# Patient Record
Sex: FEMALE | Race: WHITE | Employment: FULL TIME | URBAN - METROPOLITAN AREA
[De-identification: names, ages, dates, MRNs, and addresses within clinical notes are randomized per-mention and may not be internally consistent; named-entity substitution may affect disease eponyms.]

---

## 2017-03-10 ENCOUNTER — OFFICE VISIT (OUTPATIENT)
Dept: NEUROLOGY | Age: 42
End: 2017-03-10

## 2017-03-10 VITALS
HEART RATE: 85 BPM | WEIGHT: 228 LBS | SYSTOLIC BLOOD PRESSURE: 140 MMHG | OXYGEN SATURATION: 97 % | HEIGHT: 67 IN | RESPIRATION RATE: 14 BRPM | BODY MASS INDEX: 35.79 KG/M2 | DIASTOLIC BLOOD PRESSURE: 80 MMHG

## 2017-03-10 DIAGNOSIS — G57.13 MERALGIA PARESTHETICA, BILATERAL LOWER LIMBS: ICD-10-CM

## 2017-03-10 DIAGNOSIS — E53.8 B12 DEFICIENCY: ICD-10-CM

## 2017-03-10 DIAGNOSIS — R20.0 NUMBNESS IN BOTH LEGS: Primary | ICD-10-CM

## 2017-03-10 NOTE — PROGRESS NOTES
Chief Complaint   Patient presents with   Florentino Rosas is a 37 y.o. female who came in for a neurological consultation requested by Dr. Diana Howard. She was found to have B12 deficiency and recently started taking B12 injections. Back in 2016 she had bladder issues and some associated numbness and tingling distally in both legs that resolved after the bladder problems went away. For the past week or so she has been experiencing numbness and tingling in the anterior and lateral aspect of both thighs. She has been noticing that it comes on if she sits or stands up and she is better when she is waking up in the morning. Denies any weakness in the legs or changes in bladder or bowel function. She has also been experiencing numbness in the groins and occasionally back pain. She had an MRI scan of the lumbar spine recently which was negative. 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 She is typically reclined back in her recliner and sits in that position for several hours at a time. No symptoms in the upper extremities. No changes in vision, speech or swallowing ability. She has been losing weight intentionally. Past Medical History:   Diagnosis Date    Dysfunctional uterine bleeding     Encounter for IUD insertion 04/29/16    Kirsten    Renal stones      Current Outpatient Prescriptions   Medication Sig    FLUTICASONE/SALMETEROL (ADVAIR DISKUS IN) Take  by inhalation.  FLUTICASONE PROPIONATE (FLOVENT DISKUS IN) Take  by inhalation.  CHOLECALCIFEROL, VITAMIN D3, (VITAMIN D3 PO) Take  by mouth.  loratadine (CLARITIN) 10 mg tablet Take 10 mg by mouth.  fluticasone (FLONASE) 50 mcg/actuation nasal spray 2 Sprays by Both Nostrils route once. No current facility-administered medications for this visit.       Allergies   Allergen Reactions    Biaxin [Clarithromycin] Unknown (comments)    Ceclor [Cefaclor] Unknown (comments)    Shellfish Derived Unknown (comments)     As well as fish.  Sulfa (Sulfonamide Antibiotics) Unknown (comments)    Tetracyclines Unknown (comments)     Family History   Problem Relation Age of Onset    Diabetes Father      Type II    Diabetes Other      Aunt & Uncle    Endometriosis Mother      Social History   Substance Use Topics    Smoking status: Current Every Day Smoker    Smokeless tobacco: Never Used    Alcohol use 1.8 oz/week     0 Standard drinks or equivalent, 3 Glasses of wine per week     Past Surgical History:   Procedure Laterality Date    HX APPENDECTOMY      HX GYN  1994    Cryo    HX LITHOTRIPSY           REVIEW OF SYSTEMS  Review of Systems - History obtained from the patient  Psychological ROS: negative  ENT ROS: negative  Hematological and Lymphatic ROS: negative  Endocrine ROS: negative  Respiratory ROS: no cough, shortness of breath, or wheezing  Cardiovascular ROS: no chest pain or dyspnea on exertion  Gastrointestinal ROS: no abdominal pain, change in bowel habits, or black or bloody stools  Genito-Urinary ROS: no dysuria, trouble voiding, or hematuria  Musculoskeletal ROS: negative  Dermatological ROS: negative      PHYSICAL EXAMINATION:    Visit Vitals    /80    Pulse 85    Resp 14    Ht 5' 7\" (1.702 m)    Wt 103.4 kg (228 lb)    SpO2 97%    BMI 35.71 kg/m2     General:  Well defined, nourished, and groomed individual in no acute distress. Neck: Supple, nontender, thyroid within normal limits, no JVD, no bruits, no pain with resistance to active range of motion. Heart: Regular rate and rhythm, no murmurs, rub, or gallop. Normal S1S2. Lungs:  Clear to auscultation bilaterally with equal chest expansion, no cough, no wheeze  Musculoskeletal:  Extremities revealed no edema and had full range of motion of joints.     Psych:  Good mood and bright affect    NEUROLOGICAL EXAMINATION:     Mental Status:   Alert and oriented to person, place, and time with recent and remote memory intact. Attention span and concentration are normal. Speech is fluent with a full fund of knowledge. Cranial Nerves:    II, III, IV, VI:  Visual acuity grossly intact. Visual fields are normal.    Pupils are equal, round, and reactive to light and accommodation. Extra-ocular movements are full and fluid. Fundoscopic exam was benign, no ptosis or nystagmus. V-XII: Hearing is grossly intact. Facial features are symmetric, with normal sensation and strength. The palate rises symmetrically and the tongue protrudes midline. Sternocleidomastoids 5/5. Motor Examination: Normal tone, bulk, and strength. 5/5 muscle strength throughout. No cogwheel rigidity or clonus present. Sensory exam:  Normal throughout to pinprick, temperature, and vibration sense. Normal proprioception. Coordination:  Heel-to-shin was smooth and symmetrical bilaterally. Finger to nose and rapid arm movement testing was normal.   No resting or intention tremor    Gait and Station:  Steady while walking on toes, heels, and with tandem walking. Normal arm swing. No Rhomberg or pronator drift. No muscle wasting or fasiculations noted. Reflexes:  DTRs 2+ throughout. Toes downgoing. LABS / IMAGING  MRI Results (most recent):    Results from Hospital Encounter encounter on 10/11/16   MRI LUMB SPINE W WO CONT   Narrative INDICATION:  PARATHESIA OF LOWER EXTREMITY AND LOW BACK PAIN     EXAMINATION:  MRI LUMBAR SPINE    COMPARISON: None    TECHNIQUE: MR imaging of the lumbar spine was performed with sagittal T1, T2,  STIR;  axial T1, T2. Pre and post contrast imaging was performed using 10 mL of  Gadavist.    FINDINGS:    There is normal alignment of the lumbar spine. Vertebral body heights are  maintained. Marrow signal is normal.  The conus medullaris terminates at L1-2. No abnormal intraspinal enhancement. L1/2:  The spinal canal and foramina are widely patent.     L2/3:  The spinal canal and foramina are widely patent. L3/4:  The spinal canal and foramina are widely patent. L4/5:  The spinal canal and foramina are widely patent. L5/S1:  The spinal canal and foramina are widely patent. Impression IMPRESSION:  No significant abnormality. No abnormal enhancement. ASSESSMENT    ICD-10-CM ICD-9-CM    1. Numbness in both legs R20.0 782.0    2. B12 deficiency E53.8 266.2    3. Meralgia paresthetica, bilateral lower limbs G57.13 355.1        DISCUSSION  Ms. Malachi Rodriguez has been having numbness in the anterior aspects of both eyes for the past week. It is bilaterally symmetric and associated with mild back pain. MRI of the lumbar spine is negative which is reassuring. Given the fact that this has a positional component, it may just be meralgia paresthetica given her body habitus. She should avoid sitting or standing for prolonged periods, continue with efforts of weight loss, strengthen abdominal/core muscles and avoid tight fitting clothes. Vitamin B12 injections may also help. If the symptoms do not improve or worsen, will consider additional workup including MRI of the thoracic spine and to rule out other central/demyelinating causes. Follow-up in 3 months. Thank you for allowing me to participate in the care of Ms. Perez. Please feel free to contact me if you have any questions. I will be happy to follow to follow her along with you.       John Simpson MD  Diplomate, American Board of Psychiatry & Neurology (Neurology)  Al[place] Mehta Board of Psychiatry & Neurology (Clinical Neurophysiology)  Diplomate, American Board of Electrodiagnostic Medicine

## 2017-03-10 NOTE — MR AVS SNAPSHOT
Visit Information Date & Time Provider Department Dept. Phone Encounter #  
 3/10/2017  3:00 PM John Simpson MD [facility] at [place] [id number] Follow-up Instructions Return in about 3 months (around 6/10/2017). Upcoming Health Maintenance Date Due Pneumococcal 19-64 Medium Risk (1 of 1 - PPSV23) 8/16/1995 DTaP/Tdap/Td series (1 - Tdap) 8/16/1997 INFLUENZA AGE 9 TO ADULT 8/1/2016 PAP AKA CERVICAL CYTOLOGY 4/8/2021 Allergies as of 3/10/2017  Review Complete On: 3/10/2017 By: John Simpson MD  
  
 Severity Noted Reaction Type Reactions Biaxin [Clarithromycin]  04/07/2016    Unknown (comments) Ceclor [Cefaclor]  04/07/2016    Unknown (comments) Shellfish Derived  04/07/2016    Unknown (comments) As well as fish. Sulfa (Sulfonamide Antibiotics)  04/07/2016    Unknown (comments) Tetracyclines  04/07/2016    Unknown (comments) Current Immunizations  Never Reviewed No immunizations on file. Not reviewed this visit You Were Diagnosed With   
  
 Codes Comments Numbness in both legs    -  Primary ICD-10-CM: R20.0 ICD-9-CM: 782.0 B12 deficiency     ICD-10-CM: E53.8 ICD-9-CM: 266.2 Meralgia paresthetica, bilateral lower limbs     ICD-10-CM: G57.13 ICD-9-CM: 355.1 Vitals BP Pulse Resp Height(growth percentile) Weight(growth percentile) SpO2  
 140/80 85 14 5' 7\" (1.702 m) 228 lb (103.4 kg) 97% BMI OB Status Smoking Status 35.71 kg/m2 Having regular periods Current Every Day Smoker Vitals History BMI and BSA Data Body Mass Index Body Surface Area 35.71 kg/m 2 2.21 m 2 Preferred Pharmacy Pharmacy Name Phone [id number] [phone] , [address] [phone] Your Updated Medication List  
  
   
This list is accurate as of: 3/10/17  3:18 PM.  Always use your most recent med list.  
  
  
  
  
 ADVAIR DISKUS IN Take  by inhalation. * fluticasone 50 mcg/actuation nasal spray Commonly known as:  Avelina King 2 Sprays by Both Nostrils route once. * FLOVENT DISKUS IN Take  by inhalation. loratadine 10 mg tablet Commonly known as:  Marc [place] Take 10 mg by mouth. VITAMIN D3 PO Take  by mouth. * Notice: This list has 2 medication(s) that are the same as other medications prescribed for you. Read the directions carefully, and ask your doctor or other care provider to review them with you. Follow-up Instructions Return in about 3 months (around 6/10/2017). Introducing [facility]! Matty Nielsen introduces [employer] patient portal. Now you can access parts of your medical record, email your doctor's office, and request medication refills online. 1. In your internet browser, go to [email or web address] 2. Click on the First Time User? Click Here link in the Sign In box. You will see the New Member Sign Up page. 3. Enter your [employer] Access Code exactly as it appears below. You will not need to use this code after youve completed the sign-up process. If you do not sign up before the expiration date, you must request a new code. · [employer] Access Code: [email or web address] Expires: 6/8/2017  2:36 PM 
 
4. Enter the last four digits of your Social Security Number (xxxx) and Date of Birth (mm/dd/yyyy) as indicated and click Submit. You will be taken to the next sign-up page. 5. Create a [employer] ID. This will be your [employer] login ID and cannot be changed, so think of one that is secure and easy to remember. 6. Create a [employer] password. You can change your password at any time. 7. Enter your Password Reset Question and Answer. This can be used at a later time if you forget your password. 8. Enter your e-mail address. You will receive e-mail notification when new information is available in [address] 9. Click Sign Up. You can now view and download portions of your medical record. 10. Click the Download Summary menu link to download a portable copy of your medical information. If you have questions, please visit the Frequently Asked Questions section of the [employer] website. Remember, [employer] is NOT to be used for urgent needs. For medical emergencies, dial 911. Now available from your iPhone and Android! Please provide this summary of care documentation to your next provider. Your primary care clinician is listed as Nithya. If you have any questions after today's visit, please call [phone].

## 2017-03-10 NOTE — PROGRESS NOTES
Patient is here for numbness from knees to waist  Worse when standing  October was having numbness from knees down, was also having bladder issues

## 2017-03-10 NOTE — LETTER
3/10/2017 3:26 PM 
 
Patient:  Lanny Martinez YOB: 1975 Date of Visit: 3/10/2017 Dear MD Nathan Leo [phone] VIA Facsimile: [phone] 
 : Thank you for referring Ms. Gwen Ruano to me for evaluation/treatment. Below are the relevant portions of my assessment and plan of care. If you have questions, please do not hesitate to call me. I look forward to following Ms. Perez along with you. Sincerely, Gerald Fox MD

## 2019-07-18 ENCOUNTER — OFFICE VISIT (OUTPATIENT)
Dept: OBGYN CLINIC | Age: 44
End: 2019-07-18

## 2019-07-18 ENCOUNTER — HOSPITAL ENCOUNTER (OUTPATIENT)
Dept: MAMMOGRAPHY | Age: 44
Discharge: HOME | End: 2019-07-18
Attending: OBSTETRICS & GYNECOLOGY
Payer: COMMERCIAL

## 2019-07-18 VITALS
DIASTOLIC BLOOD PRESSURE: 60 MMHG | HEIGHT: 67 IN | BODY MASS INDEX: 37.51 KG/M2 | SYSTOLIC BLOOD PRESSURE: 122 MMHG | WEIGHT: 239 LBS

## 2019-07-18 DIAGNOSIS — R92.8 ABNORMAL MAMMOGRAM OF RIGHT BREAST: ICD-10-CM

## 2019-07-18 DIAGNOSIS — Z01.419 ENCOUNTER FOR GYNECOLOGICAL EXAMINATION (GENERAL) (ROUTINE) WITHOUT ABNORMAL FINDINGS: Primary | ICD-10-CM

## 2019-07-18 DIAGNOSIS — E66.01 SEVERE OBESITY (HCC): ICD-10-CM

## 2019-07-18 PROCEDURE — 76642 ULTRASOUND BREAST LIMITED: CPT

## 2019-07-18 RX ORDER — METFORMIN HYDROCHLORIDE 500 MG/1
1000 TABLET, EXTENDED RELEASE ORAL
COMMUNITY
Start: 2019-07-11 | End: 2019-10-10

## 2019-07-18 RX ORDER — FLUCONAZOLE 200 MG/1
200 TABLET ORAL
Qty: 3 TAB | Refills: 1 | Status: SHIPPED | OUTPATIENT
Start: 2019-07-18 | End: 2019-07-23

## 2019-07-18 NOTE — PATIENT INSTRUCTIONS

## 2019-07-18 NOTE — PROGRESS NOTES
Jose Mcintyre is a ,  44 y.o. female [facility] whose LMP was on 2019 who presents for her annual checkup. She is having no significant problems. Menstrual status:    Her periods are normal in flow. She is using one to two pads or tampons per day, usually regular and occur every 26-30 days. She denies dysmenorrhea. She reports no premenstrual symptoms. The patient is not using HRT. Contraception:    The current method of family planning is Kirsten IUD.  2019. Sexual history:    She  reports that she currently engages in sexual activity and has had partner(s) who are Male. She reports using the following method of birth control/protection: IUD. Medical conditions:    Since her last annual GYN exam about three or more years ago (2016), she has had the following changes in her health history:  Diagnosed with diabetes. Also B 12 deficiency. Pap and Mammogram History:    Her most recent Pap smear was normal obtained 2016. The patient had her mammogram today in our office. Breast Cancer History/Substance Abuse:    She has no family history of breast cancer. Osteoporosis History:    Family history does not include a first or second degree relative with osteopenia or osteoporosis. She is currently taking vit D. Past Medical History:   Diagnosis Date    Diabetes ([address]     Dysfunctional uterine bleeding     Encounter for IUD insertion 16    Kirsten    Renal stones      Past Surgical History:   Procedure Laterality Date    HX APPENDECTOMY      HX GYN      Cryo    HX LITHOTRIPSY       Current Outpatient Medications   Medication Sig Dispense Refill    cholecalciferol, vitamin D3, (VITAMIN D3 PO) TK 1 CS PO D  3    metFORMIN ER (GLUCOPHAGE XR) 500 mg tablet Take 1,000 mg by mouth.  FLUTICASONE/SALMETEROL (ADVAIR DISKUS IN) Take  by inhalation.  FLUTICASONE PROPIONATE (FLOVENT DISKUS IN) Take  by inhalation.  CHOLECALCIFEROL, VITAMIN D3, (VITAMIN D3 PO) Take  by mouth.  fluticasone (FLONASE) 50 mcg/actuation nasal spray 2 Sprays by Both Nostrils route once.  loratadine (CLARITIN) 10 mg tablet Take 10 mg by mouth. Allergies: Biaxin [clarithromycin]; Ceclor [cefaclor]; Shellfish derived; Sulfa (sulfonamide antibiotics); and Tetracyclines   Social History     Socioeconomic History    Marital status: SINGLE     Spouse name: Not on file    Number of children: Not on file    Years of education: Not on file    Highest education level: Not on file   Occupational History    Not on file   Social Needs    Financial resource strain: Not on file    Food insecurity:     Worry: Not on file     Inability: Not on file    Transportation needs:     Medical: Not on file     Non-medical: Not on file   Tobacco Use    Smoking status: Current Every Day Smoker    Smokeless tobacco: Never Used   Substance and Sexual Activity    Alcohol use: Yes     Alcohol/week: 3.0 standard drinks     Types: 3 Glasses of wine per week    Drug use: Never    Sexual activity: Yes     Partners: Male     Birth control/protection: IUD     Comment: Kirsten Myers    Physical activity:     Days per week: Not on file     Minutes per session: Not on file    Stress: Not on file   Relationships    Social connections:     Talks on phone: Not on file     Gets together: Not on file     Attends Islam service: Not on file     Active member of club or organization: Not on file     Attends meetings of clubs or organizations: Not on file     Relationship status: Not on file    Intimate partner violence:     Fear of current or ex partner: Not on file     Emotionally abused: Not on file     Physically abused: Not on file     Forced sexual activity: Not on file   Other Topics Concern    Not on file   Social History Narrative    Not on file     Tobacco History:  reports that she has been smoking.  She has never used smokeless tobacco.  Alcohol Abuse:  reports that she drinks about 3.0 standard drinks of alcohol per week. Drug Abuse:  reports that she does not use drugs.   Patient Active Problem List   Diagnosis Code    Monilia infection B37.9         Review of Systems - History obtained from the patient  Constitutional: negative for weight loss, fever, night sweats  HEENT: negative for hearing loss, earache, congestion, snoring, sorethroat  CV: negative for chest pain, palpitations, edema  Resp: negative for cough, shortness of breath, wheezing  GI: negative for change in bowel habits, abdominal pain, black or bloody stools  : negative for frequency, dysuria, hematuria, vaginal discharge  MSK: negative for back pain, joint pain, muscle pain  Breast: negative for breast lumps, nipple discharge, galactorrhea  Skin :negative for itching, rash, hives  Neuro: negative for dizziness, headache, confusion, weakness  Psych: negative for anxiety, depression, change in mood  Heme/lymph: negative for bleeding, bruising, pallor    Physical Exam    Visit Vitals  /60 (BP 1 Location: Left arm, BP Patient Position: Sitting)   Ht 5' 7\" (1.702 m)   Wt 239 lb (108.4 kg)   LMP 07/12/2019 (Exact Date)   BMI 37.43 kg/m²     Constitutional  · Appearance: well-nourished, well developed, alert, in no acute distress    HENT  · Head and Face: appears normal    Neck  · Inspection/Palpation: normal appearance, no masses or tenderness  · Lymph Nodes: no lymphadenopathy present  · Thyroid: gland size normal, nontender, no nodules or masses present on palpation    Chest  · Respiratory Effort: breathing normal  · Auscultation: normal breath sounds    Cardiovascular  · Heart:  · Auscultation: regular rate and rhythm without murmur    Breasts  · Inspection of Breasts: breasts symmetrical, no skin changes, no discharge present, nipple appearance normal, no skin retraction present  · Palpation of Breasts and Axillae: no masses present on palpation, no breast tenderness  · Axillary Lymph Nodes: no lymphadenopathy present    Gastrointestinal  · Abdominal Examination: abdomen non-tender to palpation, normal bowel sounds, no masses present  · Liver and spleen: no hepatomegaly present, spleen not palpable  · Hernias: no hernias identified    Skin  · General Inspection: no rash, cellulitis and open infected lesion right lower abdomen. Neurologic/Psychiatric  · Mental Status:  · Orientation: grossly oriented to person, place and time  · Mood and Affect: mood normal, affect appropriate    Genitourinary  · External Genitalia: normal appearance for age, no discharge present, no tenderness present, no inflammatory lesions present, no masses present, no atrophy present  · Vagina: normal vaginal vault without central or paravaginal defects, no discharge present, no inflammatory lesions present, no masses present  · Bladder: non-tender to palpation  · Urethra: appears normal  · Cervix: normal   · Uterus: normal size, shape and consistency  · Adnexa: no adnexal tenderness present, no adnexal masses present  · Perineum: perineum within normal limits, no evidence of trauma, no rashes or skin lesions present  · Anus: anus within normal limits, no hemorrhoids present  · Inguinal Lymph Nodes: no lymphadenopathy present    Assessment:  Routine gynecologic examination  Her current medical status is satisfactory with no evidence of significant gynecologic issues. Due for Kirsten replacement with [facility]. Discussed peroxide and possible oral antibiotics for skin lesion--referred back to [facility] for further rx (oral).     Plan:  Counseled re: diet, exercise, healthy lifestyle  Return for yearly wellness visits  Rec annual mammogram

## 2019-07-19 ENCOUNTER — TELEPHONE (OUTPATIENT)
Dept: OBGYN CLINIC | Age: 44
End: 2019-07-19

## 2019-07-23 LAB
CYTOLOGIST CVX/VAG CYTO: NORMAL
CYTOLOGY CVX/VAG DOC CYTO: NORMAL
CYTOLOGY CVX/VAG DOC THIN PREP: NORMAL
CYTOLOGY HISTORY:: NORMAL
DX ICD CODE: NORMAL
HPV I/H RISK 1 DNA CVX QL PROBE+SIG AMP: NEGATIVE
Lab: NORMAL
OTHER STN SPEC: NORMAL
STAT OF ADQ CVX/VAG CYTO-IMP: NORMAL

## 2020-01-20 ENCOUNTER — HOSPITAL ENCOUNTER (OUTPATIENT)
Dept: MAMMOGRAPHY | Age: 45
Discharge: HOME | End: 2020-01-20
Attending: OBSTETRICS & GYNECOLOGY
Payer: COMMERCIAL

## 2020-01-20 DIAGNOSIS — R92.8 FOLLOW-UP EXAMINATION OF ABNORMAL MAMMOGRAM: ICD-10-CM

## 2020-01-20 PROCEDURE — 76642 ULTRASOUND BREAST LIMITED: CPT

## 2020-08-10 ENCOUNTER — HOSPITAL ENCOUNTER (OUTPATIENT)
Dept: MAMMOGRAPHY | Age: 45
Discharge: HOME | End: 2020-08-10
Attending: OBSTETRICS & GYNECOLOGY
Payer: COMMERCIAL

## 2020-08-10 DIAGNOSIS — R92.8 FOLLOW-UP EXAMINATION OF ABNORMAL MAMMOGRAM: ICD-10-CM

## 2020-08-10 PROCEDURE — 76642 ULTRASOUND BREAST LIMITED: CPT

## 2020-08-10 PROCEDURE — 77063 BREAST TOMOSYNTHESIS BI: CPT

## 2021-08-23 ENCOUNTER — TELEPHONE (OUTPATIENT)
Dept: OBGYN CLINIC | Age: 46
End: 2021-08-23

## 2021-09-08 NOTE — PROGRESS NOTES
Annual exam ages 40-58      Adriana Robbins is a ,  40 y.o. female   Patient's last menstrual period was 2021. She presents for her annual checkup. She is having no significant problems. With regard to the Gardasil vaccine, she has not received it yet. Menstrual status:    Her periods are light in flow. She is using three to ten pads or tampons per day, usually regular with a 26-32 day interval with 3-7 day duration. She does not have dysmenorrhea. She reports no premenstrual symptoms. Contraception:    The current method of family planning is Hormonal IUS. (Kirsten- 2019)    Hormonal status:  She reports no perimenstrual type symptoms. She is not having vasomotor symptoms. The patient is not using any ERT. Sexual history:    She  reports being sexually active and has had partner(s) who are Male. She reports using the following method of birth control/protection: I.U.D..    Medical conditions:    Since her last annual GYN exam about two years ago, she has not the following changes in her health history: none. Surgical history confirmed with patient. has a past surgical history that includes hx appendectomy; hx lithotripsy; and hx gyn (). Pap and Mammogram History:    Her most recent Pap smear was normal, obtained 2 year(s) ago. The patient has not had a recent mammogram.    Breast Cancer History/Substance Abuse: negative      Osteoporosis History:    Family history does not include a first or second degree relative with osteopenia or osteoporosis.     A bone density scan has not been obtained     Past Medical History:   Diagnosis Date    Diabetes ([address]     Dysfunctional uterine bleeding     Encounter for IUD insertion 16    Kirsten    Renal stones     Vitamin B 12 deficiency      Past Surgical History:   Procedure Laterality Date    HX APPENDECTOMY      HX GYN      Cryo    HX LITHOTRIPSY         Current Outpatient Medications Medication Sig Dispense Refill    Trulicity 4.91 LO/7.1 mL sub-q pen INJECT AS DIRECTED ONCE WEEKLY SUBCUTANEOUSLY      metFORMIN (GLUCOPHAGE) 500 mg tablet Take 500 mg by mouth two (2) times a day.  montelukast (SINGULAIR) 10 mg tablet       CHOLECALCIFEROL, VITAMIN D3, (VITAMIN D3 PO) Take  by mouth.  FLUTICASONE/SALMETEROL (ADVAIR DISKUS IN) Take  by inhalation. (Patient not taking: Reported on 9/13/2021)      FLUTICASONE PROPIONATE (FLOVENT DISKUS IN) Take  by inhalation. (Patient not taking: Reported on 9/13/2021)      fluticasone (FLONASE) 50 mcg/actuation nasal spray 2 Sprays by Both Nostrils route once. (Patient not taking: Reported on 9/13/2021)      loratadine (CLARITIN) 10 mg tablet Take 10 mg by mouth. (Patient not taking: Reported on 9/13/2021)       Allergies: Biaxin [clarithromycin], Ceclor [cefaclor], Shellfish derived, Sulfa (sulfonamide antibiotics), and Tetracyclines     Tobacco History:  reports that she has been smoking. She has never used smokeless tobacco.  Alcohol Abuse:  reports current alcohol use of about 3.0 standard drinks of alcohol per week. Drug Abuse:  reports no history of drug use.     Family Medical/Cancer History:   Family History   Problem Relation Age of Onset    Diabetes Father         Type II    Diabetes Other         Aunt & Uncle    Endometriosis Mother         Review of Systems - History obtained from the patient  Constitutional: negative for weight loss, fever, night sweats  HEENT: negative for hearing loss, earache, congestion, snoring, sorethroat  CV: negative for chest pain, palpitations, edema  Resp: negative for cough, shortness of breath, wheezing  GI: negative for change in bowel habits, abdominal pain, black or bloody stools  : negative for frequency, dysuria, hematuria, vaginal discharge  MSK: negative for back pain, joint pain, muscle pain  Breast: negative for breast lumps, nipple discharge, galactorrhea  Skin :negative for itching, rash, hives  Neuro: negative for dizziness, headache, confusion, weakness  Psych: negative for anxiety, depression, change in mood  Heme/lymph: negative for bleeding, bruising, pallor    Physical Exam    Visit Vitals  BP (!) 152/64   Wt 235 lb (106.6 kg)   LMP 08/27/2021   BMI 36.81 kg/m²       Constitutional  · Appearance: well-nourished, well developed, alert, in no acute distress    HENT  · Head and Face: appears normal    Neck  · Inspection/Palpation: normal appearance, no masses or tenderness  · Lymph Nodes: no lymphadenopathy present  · Thyroid: gland size normal, nontender, no nodules or masses present on palpation    Chest  · Respiratory Effort: breathing unlabored  · Auscultation: normal breath sounds    Cardiovascular  · Heart:  · Auscultation: regular rate and rhythm without murmur    Breasts  · Inspection of Breasts: breasts symmetrical, no skin changes, no discharge present, nipple appearance normal, no skin retraction present  · Palpation of Breasts and Axillae: no masses present on palpation, no breast tenderness  · Axillary Lymph Nodes: no lymphadenopathy present    Gastrointestinal  · Abdominal Examination: abdomen non-tender to palpation, normal bowel sounds, no masses present  · Liver and spleen: no hepatomegaly present, spleen not palpable  · Hernias: no hernias identified    Genitourinary  · External Genitalia: normal appearance for age, no discharge present, no tenderness present, no inflammatory lesions present, no masses present, no atrophy present  · Vagina: normal vaginal vault without central or paravaginal defects, no discharge present, no inflammatory lesions present, no masses present  · Bladder: non-tender to palpation  · Urethra: appears normal  · Cervix: normal   · Uterus: normal size, shape and consistency  · Adnexa: no adnexal tenderness present, no adnexal masses present  · Perineum: perineum within normal limits, no evidence of trauma, no rashes or skin lesions present  · Anus: anus within normal limits, no hemorrhoids present  · Inguinal Lymph Nodes: no lymphadenopathy present    Skin  · General Inspection: no rash, no lesions identified    Neurologic/Psychiatric  · Mental Status:  · Orientation: grossly oriented to person, place and time  · Mood and Affect: mood normal, affect appropriate    Assessment:  Routine gynecologic examination  Her current medical status is satisfactory with no evidence of significant gynecologic issues. Will remove Kirsten () and place Morenita Flatness today--IC obtained.     Plan:  Counseled re: diet, exercise, healthy lifestyle  Return for yearly wellness visits  Rec annual mammogram

## 2021-09-13 ENCOUNTER — OFFICE VISIT (OUTPATIENT)
Dept: OBGYN CLINIC | Age: 46
End: 2021-09-13
Payer: COMMERCIAL

## 2021-09-13 VITALS — WEIGHT: 235 LBS | BODY MASS INDEX: 36.81 KG/M2 | SYSTOLIC BLOOD PRESSURE: 152 MMHG | DIASTOLIC BLOOD PRESSURE: 64 MMHG

## 2021-09-13 DIAGNOSIS — Z30.433 ENCOUNTER FOR IUD REMOVAL AND REINSERTION: ICD-10-CM

## 2021-09-13 DIAGNOSIS — N94.89 SUPPRESSION OF MENSES: ICD-10-CM

## 2021-09-13 DIAGNOSIS — Z01.419 ENCOUNTER FOR GYNECOLOGICAL EXAMINATION WITHOUT ABNORMAL FINDING: Primary | ICD-10-CM

## 2021-09-13 PROCEDURE — 99396 PREV VISIT EST AGE 40-64: CPT | Performed by: OBSTETRICS & GYNECOLOGY

## 2021-09-13 PROCEDURE — 58300 INSERT INTRAUTERINE DEVICE: CPT | Performed by: OBSTETRICS & GYNECOLOGY

## 2021-09-13 PROCEDURE — 58301 REMOVE INTRAUTERINE DEVICE: CPT | Performed by: OBSTETRICS & GYNECOLOGY

## 2021-09-13 RX ORDER — DULAGLUTIDE 0.75 MG/.5ML
INJECTION, SOLUTION SUBCUTANEOUS
COMMUNITY
Start: 2021-07-22

## 2021-09-13 RX ORDER — METFORMIN HYDROCHLORIDE 500 MG/1
500 TABLET ORAL 2 TIMES DAILY
COMMUNITY
Start: 2021-07-23

## 2021-09-13 RX ORDER — MONTELUKAST SODIUM 10 MG/1
TABLET ORAL
COMMUNITY
Start: 2021-09-12

## 2021-09-13 NOTE — PROGRESS NOTES
[facility]  OFFICE PROCEDURE PROGRESS NOTE           Chart reviewed for the following:  Billy VELAZQUEZ, have reviewed the History, Physical and updated the Allergic reactions for Sondra [facility] performed immediately prior to start of procedure:  Billy VELAZQUEZ, have performed the following reviews on Adriana Robbins prior to the start of the procedure:      * Patient was identified by name and date of birth   * Agreement on procedure being performed was verified  * Risks and Benefits explained to the patient  * Procedure site verified and marked as necessary  * Patient was positioned for comfort  * Consent was signed and verified      Time: 3:35pm        Date of procedure: 2021     Procedure performed by: Daniela Garcia MD     Provider assisted by: Billy Cullen LPN     Patient assisted by: self     How tolerated by patient: tolerated the procedure well with no complications     Post Procedural Pain Scale: 0 - No Hurt     Comments: none    -----------------------------------IUD REPLACEMENT---------------------  Indications for Removal:  Adriana Robbins is a ,  40 y.o. female [address] [place] [address],[facility] whose Patient's last menstrual period was 2021. was on 2021. who presents today for IUD replacement. Her current IUD was placed 5 years ago. She has not had any problems with the IUD. She requests replacement of the IUD because the IUD effectiveness has . The IUD removal procedure was discussed with the patient and she had no further questions. Procedure: The patient was placed in a dorsal lithotomy position and appropriately draped. On bimanual exam the uterus was anterior and normal in size with no tenderness present. A speculum exam was performed and the cervix was visualized. The cervix was prepped with zephiran solution. The IUD string was visualized. Using ring forceps , the string was grasped and the IUD removed intact.  The IUD was shown to the patient.     -----------------------------------IUD INSERTION----------------------------------------- Indications: The risks, benefits and alternatives of IUD insertion were discussed in detail. She also has reviewed Mark Wynn information. She has elected to proceed with the insertion today and she states she has no further questions. Procedure: The pelvic exam revealed normal external genitalia. On bimanual exam the uterus was anteverted and normal in size with no tenderness present. A speculum was inserted into the vagina and the cervix was visualized. The cervix was prepped with zephiran solution. The anterior lip of the cervix was not grasped with a single toothed tenaculum. The uterus was sounded with a Prince sound to 7 centimeters. Janie Montoya was then inserted without difficulty. The string was cut to 3 centimeters. She experienced a mild amount of cramping. Post Procedure Status: She tolerated the procedure with mild. The patient received Kyleena lot number [id number].

## 2021-09-13 NOTE — PATIENT INSTRUCTIONS
Well Visit, Ages 25 to 48: Care Instructions  Overview     Well visits can help you stay healthy. Your doctor has checked your overall health and may have suggested ways to take good care of yourself. Your doctor also may have recommended tests. At home, you can help prevent illness with healthy eating, regular exercise, and other steps. Follow-up care is a key part of your treatment and safety. Be sure to make and go to all appointments, and call your doctor if you are having problems. It's also a good idea to know your test results and keep a list of the medicines you take. How can you care for yourself at home? · Get screening tests that you and your doctor decide on. Screening helps find diseases before any symptoms appear. · Eat healthy foods. Choose fruits, vegetables, whole grains, protein, and low-fat dairy foods. Limit fat, especially saturated fat. Reduce salt in your diet. · Limit alcohol. If you are a man, have no more than 2 drinks a day or 14 drinks a week. If you are a woman, have no more than 1 drink a day or 7 drinks a week. · Get at least 30 minutes of physical activity on most days of the week. Walking is a good choice. You also may want to do other activities, such as running, swimming, cycling, or playing tennis or team sports. Discuss any changes in your exercise program with your doctor. · Reach and stay at a healthy weight. This will lower your risk for many problems, such as obesity, diabetes, heart disease, and high blood pressure. · Do not smoke or allow others to smoke around you. If you need help quitting, talk to your doctor about stop-smoking programs and medicines. These can increase your chances of quitting for good. · Care for your mental health. It is easy to get weighed down by worry and stress. Learn strategies to manage stress, like deep breathing and mindfulness, and stay connected with your family and community.  If you find you often feel sad or hopeless, talk with your doctor. Treatment can help. · Talk to your doctor about whether you have any risk factors for sexually transmitted infections (STIs). You can help prevent STIs if you wait to have sex with a new partner (or partners) until you've each been tested for STIs. It also helps if you use condoms (male or female condoms) and if you limit your sex partners to one person who only has sex with you. Vaccines are available for some STIs, such as HPV. · Use birth control if it's important to you to prevent pregnancy. Talk with your doctor about the choices available and what might be best for you. · If you think you may have a problem with alcohol or drug use, talk to your doctor. This includes prescription medicines (such as amphetamines and opioids) and illegal drugs (such as cocaine and methamphetamine). Your doctor can help you figure out what type of treatment is best for you. · Protect your skin from too much sun. When you're outdoors from 10 a.m. to 4 p.m., stay in the shade or cover up with clothing and a hat with a wide brim. Wear sunglasses that block UV rays. Even when it's cloudy, put broad-spectrum sunscreen (SPF 30 or higher) on any exposed skin. · See a dentist one or two times a year for checkups and to have your teeth cleaned. · Wear a seat belt in the car. When should you call for help? Watch closely for changes in your health, and be sure to contact your doctor if you have any problems or symptoms that concern you. Where can you learn more? Go to http://www.SignNow.com/  Enter P072 in the search box to learn more about \"Well Visit, Ages 25 to 48: Care Instructions. \"  Current as of: May 27, 2020               Content Version: 12.8  © 6173-9293 [employer]. Care instructions adapted under license by [employer] (which disclaims liability or warranty for this information).  If you have questions about a medical condition or this instruction, always ask your healthcare professional. [place] [address] any warranty or liability for your use of this information.

## 2021-10-07 ENCOUNTER — HOSPITAL ENCOUNTER (OUTPATIENT)
Dept: MAMMOGRAPHY | Age: 46
Discharge: HOME | End: 2021-10-07
Attending: OBSTETRICS & GYNECOLOGY
Payer: COMMERCIAL

## 2021-10-07 ENCOUNTER — TRANSCRIBE ORDER (OUTPATIENT)
Dept: MAMMOGRAPHY | Age: 46
End: 2021-10-07

## 2021-10-07 DIAGNOSIS — Z12.31 VISIT FOR SCREENING MAMMOGRAM: ICD-10-CM

## 2021-10-07 DIAGNOSIS — Z12.31 VISIT FOR SCREENING MAMMOGRAM: Primary | ICD-10-CM

## 2021-10-07 PROCEDURE — 77063 BREAST TOMOSYNTHESIS BI: CPT

## 2021-10-26 NOTE — PROGRESS NOTES
IUD followup note    This is a follow-up visit for Beba Vasquez is a ,  40 y.o. female WHITE/NON- No LMP recorded. (Menstrual status: IUD). .     She had an Mirena IUD placed six weeks ago. Since the IUD placement, the patient has not had any unusual complaints. She has had some mild non-menstrual bleeding. She describes having a light amount of blood-tinged discharge which has occurred off and on since insertion of the Mirena. She has not had any significant generalized pain. She has had no fever. Associated signs and symptoms: she denies dyspareunia, expulsion, heavy bleeding, increased pain, fever, and pelvic pain. Past Medical History:   Diagnosis Date    [address] [address]     Dysfunctional uterine bleeding     Encounter for IUD insertion 16    Kirsten    Encounter for IUD removal and reinsertion 2021    Kirsten removed--Kyleena placed    Renal stones     Vitamin B 12 deficiency      Past Surgical History:   Procedure Laterality Date    HX APPENDECTOMY      HX GYN      Cryo    HX LITHOTRIPSY       Social History     Occupational History    Not on file   Tobacco Use    Smoking status: Current Every Day Smoker    Smokeless tobacco: Never Used   Substance and Sexual Activity    Alcohol use: Yes     Alcohol/week: 3.0 standard drinks     Types: 3 Glasses of wine per week    Drug use: Never    Sexual activity: Yes     Partners: Male     Birth control/protection: I.U.D. Family History   Problem Relation Age of Onset    Diabetes Father         Type II    Diabetes Other         Aunt & Uncle    Endometriosis Mother        Allergies   Allergen Reactions    Biaxin [Clarithromycin] Unknown (comments)    Ceclor [Cefaclor] Unknown (comments)    Shellfish Derived Unknown (comments)     As well as fish.     Sulfa (Sulfonamide Antibiotics) Unknown (comments)    Tetracyclines Unknown (comments)     Prior to Admission medications    Medication Sig Start Date End Date Taking? Authorizing Provider   Trulicity 3.49 TK/1.6 mL sub-q pen INJECT AS DIRECTED ONCE WEEKLY SUBCUTANEOUSLY 7/22/21   Provider, Historical   metFORMIN (GLUCOPHAGE) 500 mg tablet Take 500 mg by mouth two (2) times a day. 7/23/21   Provider, Historical   montelukast (SINGULAIR) 10 mg tablet  9/12/21   Provider, Historical   FLUTICASONE/SALMETEROL (ADVAIR DISKUS IN) Take  by inhalation. Patient not taking: Reported on 9/13/2021    Provider, Historical   FLUTICASONE PROPIONATE (FLOVENT DISKUS IN) Take  by inhalation. Patient not taking: Reported on 9/13/2021    Provider, Historical   CHOLECALCIFEROL, VITAMIN D3, (VITAMIN D3 PO) Take  by mouth. Provider, Historical   fluticasone (FLONASE) 50 mcg/actuation nasal spray 2 Sprays by Both Nostrils route once. Patient not taking: Reported on 9/13/2021    Provider, Historical   loratadine (CLARITIN) 10 mg tablet Take 10 mg by mouth. Patient not taking: Reported on 9/13/2021    Provider, Historical        Review of Systems: History obtained from the patient  Constitutional: negative for weight loss, fever, night sweats  Breast: negative for breast lumps, nipple discharge, galactorrhea  GI: negative for change in bowel habits, abdominal pain, black or bloody stools  : negative for frequency, dysuria, hematuria, vaginal discharge  MSK: negative for back pain, joint pain, muscle pain  Skin: negative for itching, rash, hives  Psych: negative for anxiety, depression, change in mood      Objective: There were no vitals taken for this visit.     Physical Exam:   PHYSICAL EXAMINATION    Constitutional  · Appearance: well-nourished, well developed, alert, in no acute distress    Gastrointestinal  · Abdominal Examination: abdomen non-tender to palpation, normal bowel sounds, no masses present  · Liver and spleen: no hepatomegaly present, spleen not palpable  · Hernias: no hernias identified    Genitourinary  · External Genitalia: normal appearance for age, no discharge present, no tenderness present, no inflammatory lesions present, no masses present, no atrophy present  · Vagina: normal vaginal vault without central or paravaginal defects, no discharge present, no inflammatory lesions present, no masses present  · Bladder: non-tender to palpation  · Urethra: appears normal  · Cervix: normal with IUD string visible and appropriate length   · Uterus: normal size, shape and consistency  · Adnexa: no adnexal tenderness present, no adnexal masses present  · Perineum: perineum within normal limits, no evidence of trauma, no rashes or skin lesions present    Skin  · General Inspection: no rash, no lesions identified    Neurologic/Psychiatric  · Mental Status:  · Orientation: grossly oriented to person, place and time  · Mood and Affect: mood normal, affect appropriate    Assessment:  Doing well with expected mild irregular bleeding. Plan:   RTO for AE as scheduled.

## 2021-10-27 ENCOUNTER — OFFICE VISIT (OUTPATIENT)
Dept: OBGYN CLINIC | Age: 46
End: 2021-10-27
Payer: COMMERCIAL

## 2021-10-27 DIAGNOSIS — N94.89 SUPPRESSION OF MENSES: Primary | ICD-10-CM

## 2021-10-27 PROCEDURE — 99213 OFFICE O/P EST LOW 20 MIN: CPT | Performed by: OBSTETRICS & GYNECOLOGY

## 2022-03-18 PROBLEM — E66.01 SEVERE OBESITY (HCC): Status: ACTIVE | Noted: 2019-07-18

## 2022-11-07 ENCOUNTER — TRANSCRIBE ORDER (OUTPATIENT)
Dept: MAMMOGRAPHY | Age: 47
End: 2022-11-07

## 2022-11-07 DIAGNOSIS — Z12.31 VISIT FOR SCREENING MAMMOGRAM: Primary | ICD-10-CM

## 2022-11-10 ENCOUNTER — HOSPITAL ENCOUNTER (OUTPATIENT)
Dept: MAMMOGRAPHY | Age: 47
Discharge: HOME | End: 2022-11-10
Payer: COMMERCIAL

## 2022-11-10 DIAGNOSIS — Z12.31 VISIT FOR SCREENING MAMMOGRAM: ICD-10-CM

## 2022-11-10 PROCEDURE — 77063 BREAST TOMOSYNTHESIS BI: CPT

## 2023-03-03 NOTE — PROGRESS NOTES
Dolores Lin is a 56 y.o. female returns for an annual exam     Chief Complaint   Patient presents with    Annual Exam       Patient's last menstrual period was 02/17/2023. Her periods are light in flow and usually regular with a 26-32 day interval with 3-7 day duration. She does not have dysmenorrhea. Problems: no significant problems  Birth Control: IUD. Last Pap: normal obtained 4 year(s) ago. She does not have a history of LEYDI 2, 3 or cervical cancer. Last Mammogram: had a recent mammogram 11/07/2022 which was negative for malignancy. 1. Have you been to the ER, urgent care clinic, or hospitalized since your last visit? No    2. Have you seen or consulted any other health care providers outside of the [address] [place] [address] since your last visit? No    Examination chaperoned by Eduardo Jean Baptiste LPN.

## 2023-03-06 ENCOUNTER — OFFICE VISIT (OUTPATIENT)
Dept: OBGYN CLINIC | Age: 48
End: 2023-03-06
Payer: COMMERCIAL

## 2023-03-06 VITALS — DIASTOLIC BLOOD PRESSURE: 82 MMHG | SYSTOLIC BLOOD PRESSURE: 145 MMHG | BODY MASS INDEX: 38.97 KG/M2 | WEIGHT: 248.8 LBS

## 2023-03-06 DIAGNOSIS — Z01.419 ENCOUNTER FOR GYNECOLOGICAL EXAMINATION WITHOUT ABNORMAL FINDING: Primary | ICD-10-CM

## 2023-03-06 DIAGNOSIS — Z12.4 ENCOUNTER FOR PAPANICOLAOU SMEAR FOR CERVICAL CANCER SCREENING: ICD-10-CM

## 2023-03-06 PROCEDURE — 99396 PREV VISIT EST AGE 40-64: CPT | Performed by: OBSTETRICS & GYNECOLOGY

## 2023-03-06 NOTE — PROGRESS NOTES
Annual exam  Leticia Cedeno is a ,  56 y.o. female   Patient's last menstrual period was 2023. She presents for her annual checkup. She is having no significant problems. Menstrual status:    Her periods are light in flow and usually regular with a 26-32 day interval with 3-7 day duration. She does not have dysmenorrhea. Sexual history:    She  reports being sexually active and has had partner(s) who are male. She reports using the following method of birth control/protection: I.U.D..    Per Nursing Note:  Last Pap: normal obtained 4 year(s) ago. She does not have a history of LEYDI 2, 3 or cervical cancer. Last Mammogram: had a recent mammogram 2022 which was negative for malignancy. Past Medical History:   Diagnosis Date    Diabetes ([facility] [address]     Dysfunctional uterine bleeding     Encounter for IUD insertion 16    Kirsten    Encounter for IUD removal and reinsertion 2021    Kirsten removed--Kyleena placed    Renal stones     Vitamin B 12 deficiency      Past Surgical History:   Procedure Laterality Date    HX APPENDECTOMY      HX GYN      Cryo    HX LITHOTRIPSY         Current Outpatient Medications   Medication Sig Dispense Refill    Trulicity 8.35 XM/0.0 mL sub-q pen INJECT AS DIRECTED ONCE WEEKLY SUBCUTANEOUSLY      metFORMIN (GLUCOPHAGE) 500 mg tablet Take 500 mg by mouth two (2) times a day. montelukast (SINGULAIR) 10 mg tablet       FLUTICASONE/SALMETEROL (ADVAIR DISKUS IN) Take  by inhalation. (Patient not taking: Reported on 2021)      FLUTICASONE PROPIONATE (FLOVENT DISKUS IN) Take  by inhalation. (Patient not taking: Reported on 2021)      CHOLECALCIFEROL, VITAMIN D3, (VITAMIN D3 PO) Take  by mouth. fluticasone (FLONASE) 50 mcg/actuation nasal spray 2 Sprays by Both Nostrils route once. (Patient not taking: Reported on 2021)      loratadine (CLARITIN) 10 mg tablet Take 10 mg by mouth.  (Patient not taking: Reported on 2021) Allergies: Biaxin [clarithromycin], Ceclor [cefaclor], Shellfish derived, Sulfa (sulfonamide antibiotics), and Tetracyclines     Tobacco History:  reports that she has been smoking. She has never used smokeless tobacco.  Alcohol Abuse:  reports current alcohol use of about 3.0 standard drinks per week. Drug Abuse:  reports no history of drug use. Family Medical/Cancer History:   Family History   Problem Relation Age of Onset    Diabetes Father         Type II    Diabetes Other         Aunt & Uncle    Endometriosis Mother         Review of Systems - History obtained from the patient  Constitutional: negative for weight loss, fever, night sweats  HEENT: negative for hearing loss, earache, congestion, snoring, sorethroat  CV: negative for chest pain, palpitations, edema  Resp: negative for cough, shortness of breath, wheezing  GI: negative for change in bowel habits, abdominal pain, black or bloody stools  : negative for frequency, dysuria, hematuria, vaginal discharge  MSK: negative for back pain, joint pain, muscle pain  Breast: negative for breast lumps, nipple discharge, galactorrhea  Skin :negative for itching, rash, hives  Neuro: negative for dizziness, headache, confusion, weakness  Psych: negative for anxiety, depression, change in mood  Heme/lymph: negative for bleeding, bruising, pallor    Physical Exam    Visit Vitals  BP (!) 145/82   Wt 248 lb 12.8 oz (112.9 kg)   LMP 02/17/2023   BMI 38.97 kg/m²       Constitutional  Appearance: well-nourished, well developed, alert, in no acute distress    HENT  Head and Face: appears normal    Neck  Inspection/Palpation: normal appearance, no masses or tenderness  Lymph Nodes: no lymphadenopathy present  Thyroid: gland size normal, nontender, no nodules or masses present on palpation    Chest  Respiratory Effort: breathing unlabored  Auscultation: normal breath sounds    Cardiovascular  Heart:   Auscultation: regular rate and rhythm without murmur    Breasts  Inspection of Breasts: breasts symmetrical, no skin changes, no discharge present, nipple appearance normal, no skin retraction present  Palpation of Breasts and Axillae: no masses present on palpation, no breast tenderness  Axillary Lymph Nodes: no lymphadenopathy present    Gastrointestinal  Abdominal Examination: abdomen non-tender to palpation, normal bowel sounds, no masses present  Liver and spleen: no hepatomegaly present, spleen not palpable  Hernias: no hernias identified    Genitourinary  External Genitalia: normal appearance for age, no discharge present, no tenderness present, no inflammatory lesions present, no masses present, no atrophy present  Vagina: normal vaginal vault without central or paravaginal defects, no discharge present, no inflammatory lesions present, no masses present  Bladder: non-tender to palpation  Urethra: appears normal  Cervix: normal   Uterus: normal size, shape and consistency  Adnexa: no adnexal tenderness present, no adnexal masses present  Perineum: perineum within normal limits, no evidence of trauma, no rashes or skin lesions present  Anus: anus within normal limits, no hemorrhoids present  Inguinal Lymph Nodes: no lymphadenopathy present    Skin  General Inspection: no rash, no lesions identified    Neurologic/Psychiatric  Mental Status:  Orientation: grossly oriented to person, place and time  Mood and Affect: mood normal, affect appropriate    Assessment:  Routine gynecologic examination  Her current medical status is satisfactory with no evidence of significant gynecologic issues.     Plan:  Counseled re: diet, exercise, healthy lifestyle  Return for yearly wellness visits  Gardisil counseling provided  Pt counseled regarding co-testing for high risk HPV with pap  Rec screening mammo at either 36 or 41

## 2023-03-09 LAB
CYTOLOGIST CVX/VAG CYTO: NORMAL
CYTOLOGY CVX/VAG DOC CYTO: NORMAL
CYTOLOGY CVX/VAG DOC THIN PREP: NORMAL
CYTOLOGY HISTORY:: NORMAL
DX ICD CODE: NORMAL
HPV GENOTYPE REFLEX: NORMAL
HPV I/H RISK 4 DNA CVX QL PROBE+SIG AMP: NEGATIVE
Lab: NORMAL
OTHER STN SPEC: NORMAL
STAT OF ADQ CVX/VAG CYTO-IMP: NORMAL

## 2023-05-21 RX ORDER — DULAGLUTIDE 0.75 MG/.5ML
INJECTION, SOLUTION SUBCUTANEOUS
COMMUNITY
Start: 2021-07-22

## 2023-05-21 RX ORDER — FLUTICASONE PROPIONATE 50 MCG
2 SPRAY, SUSPENSION (ML) NASAL ONCE
COMMUNITY

## 2023-05-21 RX ORDER — LORATADINE 10 MG/1
10 TABLET ORAL
COMMUNITY

## 2023-05-21 RX ORDER — MONTELUKAST SODIUM 10 MG/1
TABLET ORAL
COMMUNITY
Start: 2021-09-12

## 2024-08-15 ENCOUNTER — HOSPITAL ENCOUNTER (OUTPATIENT)
Facility: HOSPITAL | Age: 49
Discharge: HOME | End: 2024-08-15
Payer: COMMERCIAL

## 2024-08-15 VITALS — WEIGHT: 225 LBS

## 2024-08-15 DIAGNOSIS — Z12.31 ENCOUNTER FOR SCREENING MAMMOGRAM FOR MALIGNANT NEOPLASM OF BREAST: ICD-10-CM

## 2024-08-15 PROCEDURE — 77063 BREAST TOMOSYNTHESIS BI: CPT

## 2024-08-20 ENCOUNTER — OFFICE VISIT (OUTPATIENT)
Age: 49
End: 2024-08-20
Payer: COMMERCIAL

## 2024-08-20 VITALS — DIASTOLIC BLOOD PRESSURE: 76 MMHG | SYSTOLIC BLOOD PRESSURE: 125 MMHG | WEIGHT: 221 LBS

## 2024-08-20 DIAGNOSIS — Z01.419 ENCOUNTER FOR GYNECOLOGICAL EXAMINATION (GENERAL) (ROUTINE) WITHOUT ABNORMAL FINDINGS: Primary | ICD-10-CM

## 2024-08-20 PROCEDURE — 99396 PREV VISIT EST AGE 40-64: CPT | Performed by: OBSTETRICS & GYNECOLOGY

## 2024-08-20 RX ORDER — FLUCONAZOLE 200 MG/1
TABLET ORAL
Qty: 3 TABLET | Refills: 2 | Status: SHIPPED | OUTPATIENT
Start: 2024-08-20

## 2024-08-20 RX ORDER — TIRZEPATIDE 10 MG/.5ML
INJECTION, SOLUTION SUBCUTANEOUS
COMMUNITY
Start: 2024-08-01

## 2024-08-20 RX ORDER — HYDROCHLOROTHIAZIDE 12.5 MG/1
12.5 TABLET ORAL EVERY MORNING
COMMUNITY
Start: 2024-08-12

## 2024-08-20 RX ORDER — ROSUVASTATIN CALCIUM 20 MG/1
20 TABLET, COATED ORAL DAILY
COMMUNITY
Start: 2024-05-29

## 2024-08-20 SDOH — ECONOMIC STABILITY: FOOD INSECURITY: WITHIN THE PAST 12 MONTHS, THE FOOD YOU BOUGHT JUST DIDN'T LAST AND YOU DIDN'T HAVE MONEY TO GET MORE.: NEVER TRUE

## 2024-08-20 SDOH — ECONOMIC STABILITY: FOOD INSECURITY: WITHIN THE PAST 12 MONTHS, YOU WORRIED THAT YOUR FOOD WOULD RUN OUT BEFORE YOU GOT MONEY TO BUY MORE.: NEVER TRUE

## 2024-08-20 SDOH — ECONOMIC STABILITY: INCOME INSECURITY: HOW HARD IS IT FOR YOU TO PAY FOR THE VERY BASICS LIKE FOOD, HOUSING, MEDICAL CARE, AND HEATING?: NOT HARD AT ALL

## 2024-08-20 ASSESSMENT — PATIENT HEALTH QUESTIONNAIRE - PHQ9
SUM OF ALL RESPONSES TO PHQ QUESTIONS 1-9: 0
2. FEELING DOWN, DEPRESSED OR HOPELESS: NOT AT ALL
SUM OF ALL RESPONSES TO PHQ9 QUESTIONS 1 & 2: 0
SUM OF ALL RESPONSES TO PHQ QUESTIONS 1-9: 0
1. LITTLE INTEREST OR PLEASURE IN DOING THINGS: NOT AT ALL

## 2024-08-20 NOTE — PROGRESS NOTES
Peace Montanez is a 49 y.o. female returns for an annual exam     No chief complaint on file.      No LMP recorded. (Menstrual status: IUD).  Her periods are absent in flow  Problems: no problems  Birth Control: IUD.  Last Pap: normal obtained 1 year(s) ago.  She does not have a history of RJ 2, 3 or cervical cancer.   Last Mammogram: had her mammogram today in our office.            1. Have you been to the ER, urgent care clinic, or hospitalized since your last visit? No    2. Have you seen or consulted any other health care providers outside of the [facility] since your last visit? No    Examination chaperoned by Prashanth Garcia LPN.  
    Allergies: Cefaclor, Clarithromycin, Shellfish allergy, Sulfa antibiotics, and Tetracyclines & related     Tobacco History:  reports that she has been smoking. She has never used smokeless tobacco.  Alcohol Abuse:  reports current alcohol use of about 3.0 standard drinks of alcohol per week.  Drug Abuse:  reports no history of drug use.    Family Medical/Cancer History:   Family History   Problem Relation Age of Onset    Diabetes Other         Aunt & Uncle    Endometriosis Mother     Diabetes Father         Type II        Review of Systems - History obtained from the patient  Constitutional: negative for weight loss, fever, night sweats  HEENT: negative for hearing loss, earache, congestion, snoring, sorethroat  CV: negative for chest pain, palpitations, edema  Resp: negative for cough, shortness of breath, wheezing  GI: negative for change in bowel habits, abdominal pain, black or bloody stools  : negative for frequency, dysuria, hematuria, vaginal discharge  MSK: negative for back pain, joint pain, muscle pain  Breast: negative for breast lumps, nipple discharge, galactorrhea  Skin :negative for itching, rash, hives  Neuro: negative for dizziness, headache, confusion, weakness  Psych: negative for anxiety, depression, change in mood  Heme/lymph: negative for bleeding, bruising, pallor    Physical Exam    /76   Wt 100.2 kg (221 lb)   LMP 08/14/2024     Constitutional  Appearance: well-nourished, well developed, alert, in no acute distress    HENT  Head and Face: appears normal    Neck  Inspection/Palpation: normal appearance, no masses or tenderness  Lymph Nodes: no lymphadenopathy present  Thyroid: gland size normal, nontender, no nodules or masses present on palpation    Chest  Respiratory Effort: breathing unlabored  Auscultation: normal breath sounds    Cardiovascular  Heart:  Auscultation: regular rate and rhythm without murmur    Breasts  Inspection of Breasts: breasts symmetrical, no skin